# Patient Record
Sex: MALE | Race: ASIAN
[De-identification: names, ages, dates, MRNs, and addresses within clinical notes are randomized per-mention and may not be internally consistent; named-entity substitution may affect disease eponyms.]

---

## 2017-05-08 ENCOUNTER — HOSPITAL ENCOUNTER (EMERGENCY)
Dept: HOSPITAL 74 - JER | Age: 62
Discharge: HOME | End: 2017-05-08
Payer: COMMERCIAL

## 2017-05-08 VITALS — BODY MASS INDEX: 26.3 KG/M2

## 2017-05-08 VITALS — SYSTOLIC BLOOD PRESSURE: 137 MMHG | HEART RATE: 66 BPM | DIASTOLIC BLOOD PRESSURE: 81 MMHG | TEMPERATURE: 98 F

## 2017-05-08 DIAGNOSIS — E78.00: ICD-10-CM

## 2017-05-08 DIAGNOSIS — I10: Primary | ICD-10-CM

## 2017-05-08 DIAGNOSIS — Z85.038: ICD-10-CM

## 2017-05-08 DIAGNOSIS — Z79.84: ICD-10-CM

## 2017-05-08 DIAGNOSIS — E07.89: ICD-10-CM

## 2017-05-08 DIAGNOSIS — E11.9: ICD-10-CM

## 2017-05-08 DIAGNOSIS — N40.0: ICD-10-CM

## 2017-05-08 LAB
ALBUMIN SERPL-MCNC: 4.1 G/DL (ref 3.4–5)
ALP SERPL-CCNC: 48 U/L (ref 45–117)
ALT SERPL-CCNC: 23 U/L (ref 12–78)
ANION GAP SERPL CALC-SCNC: 4 MMOL/L (ref 8–16)
AST SERPL-CCNC: 18 U/L (ref 15–37)
BASOPHILS # BLD: 0.7 % (ref 0–2)
BILIRUB SERPL-MCNC: 0.9 MG/DL (ref 0.2–1)
CALCIUM SERPL-MCNC: 9.2 MG/DL (ref 8.5–10.1)
CO2 SERPL-SCNC: 30 MMOL/L (ref 21–32)
COCKROFT - GAULT: 82.15
CREAT SERPL-MCNC: 1 MG/DL (ref 0.7–1.3)
DEPRECATED RDW RBC AUTO: 13.6 % (ref 11.9–15.9)
EOSINOPHIL # BLD: 7.9 % (ref 0–4.5)
GLUCOSE SERPL-MCNC: 111 MG/DL (ref 74–106)
MCH RBC QN AUTO: 29.5 PG (ref 25.7–33.7)
MCHC RBC AUTO-ENTMCNC: 34.3 G/DL (ref 32–35.9)
MCV RBC: 86 FL (ref 80–96)
NEUTROPHILS # BLD: 59.3 % (ref 42.8–82.8)
PLATELET # BLD AUTO: 180 K/MM3 (ref 134–434)
PMV BLD: 8.6 FL (ref 7.5–11.1)
PROT SERPL-MCNC: 7 G/DL (ref 6.4–8.2)
TROPONIN I SERPL-MCNC: < 0.02 NG/ML (ref 0–0.05)
TROPONIN I SERPL-MCNC: < 0.02 NG/ML (ref 0–0.05)
WBC # BLD AUTO: 6.3 K/MM3 (ref 4–10)

## 2017-05-08 NOTE — EKG
Test Reason : 

Blood Pressure : ***/*** mmHG

Vent. Rate : 068 BPM     Atrial Rate : 068 BPM

   P-R Int : 170 ms          QRS Dur : 094 ms

    QT Int : 388 ms       P-R-T Axes : 068 007 067 degrees

   QTc Int : 412 ms

 

NORMAL SINUS RHYTHM

INCOMPLETE RIGHT BUNDLE BRANCH BLOCK

NONSPECIFIC T WAVE ABNORMALITY

ABNORMAL ECG

WHEN COMPARED WITH ECG OF 08-MAY-2017 05:53,

NO SIGNIFICANT CHANGE WAS FOUND

Confirmed by DESHAWN KAMARA MD ([id number]) on 5/8/2017 1:41:59 PM

 

Referred By:             Confirmed By:DESHAWN KAMARA MD

## 2017-05-08 NOTE — EKG
Test Reason : 

Blood Pressure : ***/*** mmHG

Vent. Rate : 064 BPM     Atrial Rate : 064 BPM

   P-R Int : 174 ms          QRS Dur : 102 ms

    QT Int : 384 ms       P-R-T Axes : 072 056 066 degrees

   QTc Int : 396 ms

 

NORMAL SINUS RHYTHM WITH SINUS ARRHYTHMIA

NORMAL ECG

WHEN COMPARED WITH ECG OF 26-JUN-2016 11:51,

NO SIGNIFICANT CHANGE WAS FOUND

Confirmed by DESHAWN KAMARA MD ([id number]) on 5/8/2017 1:29:54 PM

 

Referred By:             Confirmed By:DESHAWN KAMARA MD

## 2017-05-08 NOTE — PDOC
*Physical Exam





- Vital Signs


 Last Vital Signs











Temp Pulse Resp BP Pulse Ox


 


 97.5 F L  72   18   144/93   97 


 


 05/08/17 06:03  05/08/17 06:03  05/08/17 06:03  05/08/17 06:03  05/08/17 06:03














**Heart Score/ECG Review


  ** #2


General ECG Interpretation: Sinus Rhythm (rate68)


Compared to previous ECG there are: No significant change





ED Treatment Course





- LABORATORY


CBC & Chemistry Diagram: 


 05/08/17 06:30





 05/08/17 06:30





- ADDITIONAL ORDERS


Additional order review: 


 Laboratory  Results











  05/08/17





  06:30


 


Sodium  139


 


Potassium  4.6


 


Chloride  105


 


Carbon Dioxide  30


 


Anion Gap  4 L


 


BUN  14


 


Creatinine  1.0


 


Creat Clearance w eGFR  > 60


 


Random Glucose  111 H


 


Calcium  9.2


 


Total Bilirubin  0.9


 


AST  18


 


ALT  23


 


Alkaline Phosphatase  48


 


Creatine Kinase  111


 


Troponin I  < 0.02


 


Total Protein  7.0


 


Albumin  4.1  D








 











  05/08/17





  06:30


 


RBC  5.25


 


MCV  86.0


 


MCHC  34.3


 


RDW  13.6


 


MPV  8.6


 


Neutrophils %  59.3  D


 


Lymphocytes %  26.6  D


 


Monocytes %  5.5


 


Eosinophils %  7.9 H


 


Basophils %  0.7














Medical Decision Making





- Medical Decision Making


05/08/17 07:30


Patient received in sign out from SHAYLA Bauman. Patient woke up at 1 AM with 

chest pressure took Diovan and symptoms relieved but decided to come to the ER. 

Patient has been asymptomatic here and is pending a second troponin Along with 

an EKG at 10:30 AM>





05/08/17 07:30


 Laboratory Tests











  05/08/17 05/08/17





  06:30 06:30


 


WBC  6.3 


 


Hgb  15.5 


 


Hct  45.1 


 


Plt Count  180 


 


Neutrophils %  59.3  D 


 


Sodium   139


 


Potassium   4.6


 


Chloride   105


 


Carbon Dioxide   30


 


Anion Gap   4 L


 


BUN   14


 


Creatinine   1.0


 


Random Glucose   111 H


 


Calcium   9.2


 


AST   18


 


ALT   23


 


Alkaline Phosphatase   48


 


Troponin I   < 0.02














05/08/17 12:26


 Selected Entries











  05/08/17





  07:15


 


Temperature 97.7 F


 


Pulse Rate [ 68





Right Radial] 


 


Respiratory 18





Rate 


 


Blood Pressure 132/92





[Left Arm] 


 


O2 Sat by Pulse 100





Oximetry (%) 








 Laboratory Tests











  05/08/17





  11:29


 


Creatine Kinase  95


 


Troponin I  < 0.02





Patient remains asymptomatic and will discharge home








*DC/Admit/Observation/Transfer


Diagnosis at time of Disposition: 


Hypertension


Qualifiers:


 Hypertension type: essential hypertension Qualified Code(s): I10 - Essential (

primary) hypertension





Chest pain


Qualifiers:


 Chest pain type: unspecified Qualified Code(s): R07.9 - Chest pain, unspecified





- Discharge Dispostion


Condition at time of disposition: Improved





- Referrals


Referrals: 


Becki Dugan MD [Primary Care Provider] - 





- Patient Instructions


Printed Discharge Instructions:  DI for High Blood Pressure


Additional Instructions: 


Please continue taking medication as prescribed and follow-up with your PCP.


If his symptoms return or worsen, you may come to the ER.

## 2017-05-08 NOTE — PDOC
History of Present Illness





- General


Chief Complaint: Blood Pressure Problem


Stated Complaint: HIGH BP,  CHEST DISCOMFORT


Time Seen by Provider: 05/08/17 07:00


History Source: Patient


Exam Limitations: No Limitations





- History of Present Illness


Initial Comments: 


05/08/17 07:02


Patient is a 61-year-old male with history of hypertension, diabetes, BPH, HLD, 

colon cancer, bowel resection, no chemo or radiation, complaining of chest 

pressure and elevated blood pressure. Patient states that he woke up this 

morning with palpitationsheart racing. He got up and took his blood pre and it 

was elevated on the right to 170/110 and 150/90 on the left.  Shortly after had 

sudden onset of hot flashes and pain in the neck, chest pain described as a 

pressure, 9/10, nonpleuritic, and dizziness described as spinning.  denies 

dizziness, nausea, vomiting.  He took 80 mg of Diovan and then reported to the 

emergency room for evaluation. Patient states that he's been having problems 

with elevation in blood pressure for x 1 week.  Stress/ECHO normal 4 years ago.

  No recent travel no leg swelling.  








PMD:  Dr. Dugan


Cardio: Dr. Anne


PMHX: as above


PSocHX:  neg etoh, drug, cig


FAMhx: noncontributory


ALL:  NKDA





GENERAL/CONSTITUTIONAL: [No fever or chills. No weakness. No weight change.]


HEAD, EYES, EARS, NOSE AND THROAT: [No change in vision. No ear pain or 

discharge. No sore throat.]


CARDIOVASCULAR: (+) chest pain or shortness of breath.]


RESPIRATORY: [No cough, wheezing, or hemoptysis.]


GASTROINTESTINAL: [No nausea, vomiting, diarrhea or constipation. No rectal 

bleeding.]


GENITOURINARY: [No dysuria, frequency, or change in urination.]


MUSCULOSKELETAL: [No joint or muscle swelling or pain. No neck or back pain.]


SKIN AND BREASTS: [No rash or easy bruising.]


NEUROLOGIC: [No headache, vertigo, loss of consciousness, or loss of sensation.]


PSYCHIATRIC: [No depression or anxiety.]


ENDOCRINE: [No increased thirst. No abnormal weight change.]


HEMATOLOGIC/LYMPHATIC: [No anemia, easy bleeding, or history of blood clots.]


ALLERGIC/IMMUNOLOGIC: [No hives or skin allergy. No latex allergy.]





GENERAL: [The patient is awake, alert, and fully oriented, in no acute distress.

]


HEAD: [Normal with no signs of trauma.]


EYES: [Pupils equal, round and reactive to light, extraocular movements intact, 

sclera anicteric, conjunctiva clear.]


ENT: [Ears normal, nares patent, oropharynx clear without exudates.  Moist 

mucous membranes.]


NECK: [Normal range of motion, supple without lymphadenopathy, JVD, or masses.]


LUNGS: [Breath sounds equal, clear to auscultation bilaterally.  No wheezes, 

and no crackles.]


HEART: [Regular rate and rhythm, normal S1 and S2 without murmur, rub.]


ABDOMEN: [Soft, nontender, normoactive bowel sounds.  No guarding, no rebound.  

No masses.]


EXTREMITIES: [Normal range of motion, no edema.  No clubbing or cyanosis. No 

cords, erythema, or tenderness.]


NEUROLOGICAL: [Cranial nerves II through XII grossly intact.  Normal speech, 

normal gait.]


PSYCH: [Normal mood, normal affect.]


SKIN: [Warm, Dry, normal turgor, no rashes or lesions noted.]





Past History





- Past Medical History


Allergies/Adverse Reactions: 


 Allergies











Allergy/AdvReac Type Severity Reaction Status Date / Time


 


No Known Allergies Allergy   Verified 05/08/17 06:08











Home Medications: 


Ambulatory Orders





Aspirin [ASA -] 81 mg PO DAILY 06/26/16 


Metformin HCl [Metformin HCl ER] 1,000 mg PO DAILY 06/26/16 


Pravastatin Sodium [Pravachol -] 80 mg PO HS 06/26/16 


Valsartan [Diovan] 160 mg PO DAILY #30 tablet 06/27/16 


Diltiazem Cd [Cardizem Cd -] 240 mg PO DAILY 05/08/17 


Dutasteride [Avodart] 0.5 mg PO HS 05/08/17 








Cancer: Yes (colon)


HTN: Yes


Hypercholesterolemia: Yes





- Surgical History


GI Surgery: Yes (colon cancer)





- Psycho/Social/Smoking Cessation Hx


Suicidal Ideation: No


Smoking History: Former smoker


Have you smoked in the past 12 months: No


If you are a former smoker, when did you quit?: 1987


Information on smoking cessation initiated: No


Hx Alcohol Use: No


Drug/Substance Use Hx: No


Substance Use Type: None


Hx Substance Use Treatment: No





*Physical Exam





- Vital Signs


 Last Vital Signs











Temp Pulse Resp BP Pulse Ox


 


 97.5 F L  72   18   144/93   97 


 


 05/08/17 06:03  05/08/17 06:03  05/08/17 06:03  05/08/17 06:03  05/08/17 06:03














ED Treatment Course





- LABORATORY


CBC & Chemistry Diagram: 


 05/08/17 06:30





 05/08/17 06:30





- ADDITIONAL ORDERS


Additional order review: 


 Laboratory  Results











  05/08/17





  06:30


 


Sodium  139


 


Potassium  4.6


 


Chloride  105


 


Carbon Dioxide  30


 


Anion Gap  4 L


 


BUN  14


 


Creatinine  1.0


 


Creat Clearance w eGFR  > 60


 


Random Glucose  111 H


 


Calcium  9.2


 


Total Bilirubin  0.9


 


AST  18


 


ALT  23


 


Alkaline Phosphatase  48


 


Creatine Kinase  111


 


Troponin I  < 0.02


 


Total Protein  7.0


 


Albumin  4.1  D








 











  05/08/17





  06:30


 


RBC  5.25


 


MCV  86.0


 


MCHC  34.3


 


RDW  13.6


 


MPV  8.6


 


Neutrophils %  59.3  D


 


Lymphocytes %  26.6  D


 


Monocytes %  5.5


 


Eosinophils %  7.9 H


 


Basophils %  0.7














- RADIOLOGY


Radiology Studies Ordered: 














 Category Date Time Status


 


 CHEST X-RAY PORTABLE* [RAD] Stat Radiology  05/08/17 06:26 Taken














Medical Decision Making





- Medical Decision Making





05/08/17 07:36


Patient is a 61-year-old male with history of hypertension, diabetes, BPH, HLD, 

colon cancer, bowel resection, no chemo or radiation, complaining of chest 

pressure and elevated blood pressure.  


patient is elevated cardiac risk factors will get troponin/EKG, labs, aspirin 

325 mg








EKG Normal sinus [id number], normal axis,no ST-T wave





case was endorsed still NP Deanna pending labs and disposition.





*DC/Admit/Observation/Transfer


Diagnosis at time of Disposition: 


Hypertension


Qualifiers:


 Hypertension type: essential hypertension Qualified Code(s): I10 - Essential (

primary) hypertension





Chest pain


Qualifiers:


 Chest pain type: unspecified Qualified Code(s): R07.9 - Chest pain, unspecified

## 2022-12-19 ENCOUNTER — HOSPITAL ENCOUNTER (INPATIENT)
Dept: HOSPITAL 74 - JER | Age: 67
LOS: 4 days | Discharge: HOME | DRG: 194 | End: 2022-12-23
Attending: INTERNAL MEDICINE | Admitting: HOSPITALIST
Payer: COMMERCIAL

## 2022-12-19 VITALS — BODY MASS INDEX: 25.8 KG/M2

## 2022-12-19 DIAGNOSIS — R33.8: ICD-10-CM

## 2022-12-19 DIAGNOSIS — B96.89: ICD-10-CM

## 2022-12-19 DIAGNOSIS — N17.9: ICD-10-CM

## 2022-12-19 DIAGNOSIS — I10: ICD-10-CM

## 2022-12-19 DIAGNOSIS — R19.7: ICD-10-CM

## 2022-12-19 DIAGNOSIS — E11.9: ICD-10-CM

## 2022-12-19 DIAGNOSIS — J10.1: Primary | ICD-10-CM

## 2022-12-19 DIAGNOSIS — N32.81: ICD-10-CM

## 2022-12-19 DIAGNOSIS — N40.1: ICD-10-CM

## 2022-12-19 DIAGNOSIS — E78.5: ICD-10-CM

## 2022-12-19 DIAGNOSIS — Z85.038: ICD-10-CM

## 2022-12-19 DIAGNOSIS — K57.90: ICD-10-CM

## 2022-12-19 DIAGNOSIS — N39.0: ICD-10-CM

## 2022-12-19 DIAGNOSIS — N20.0: ICD-10-CM

## 2022-12-19 LAB
ALBUMIN SERPL-MCNC: 2.8 G/DL (ref 3.4–5)
ALP SERPL-CCNC: 102 U/L (ref 45–117)
ALT SERPL-CCNC: 25 U/L (ref 13–61)
ANION GAP SERPL CALC-SCNC: 8 MMOL/L (ref 8–16)
APPEARANCE UR: (no result)
AST SERPL-CCNC: 28 U/L (ref 15–37)
BACTERIA # UR AUTO: (no result) /UL (ref 0–1359)
BASOPHILS # BLD: 0.2 % (ref 0–2)
BILIRUB SERPL-MCNC: 1.1 MG/DL (ref 0.2–1)
BILIRUB UR STRIP.AUTO-MCNC: NEGATIVE MG/DL
BUN SERPL-MCNC: 32.4 MG/DL (ref 7–18)
CALCIUM SERPL-MCNC: 10.1 MG/DL (ref 8.5–10.1)
CASTS URNS QL MICRO: 2 /UL (ref 0–3.1)
CHLORIDE SERPL-SCNC: 105 MMOL/L (ref 98–107)
CO2 SERPL-SCNC: 27 MMOL/L (ref 21–32)
COLOR UR: (no result)
CREAT SERPL-MCNC: 2.4 MG/DL (ref 0.55–1.3)
DEPRECATED RDW RBC AUTO: 14.7 % (ref 11.9–15.9)
EOSINOPHIL # BLD: 0.9 % (ref 0–4.5)
EPITH CASTS URNS QL MICRO: 3 /UL (ref 0–25.1)
GLUCOSE SERPL-MCNC: 156 MG/DL (ref 74–106)
HCT VFR BLD CALC: 45.7 % (ref 35.4–49)
HGB BLD-MCNC: 15.1 GM/DL (ref 11.7–16.9)
KETONES UR QL STRIP: NEGATIVE
LEUKOCYTE ESTERASE UR QL STRIP.AUTO: (no result)
LIPASE SERPL-CCNC: 267 U/L (ref 73–393)
LYMPHOCYTES # BLD: 9.6 % (ref 8–40)
MCH RBC QN AUTO: 28.5 PG (ref 25.7–33.7)
MCHC RBC AUTO-ENTMCNC: 33.1 G/DL (ref 32–35.9)
MCV RBC: 86.1 FL (ref 80–96)
MONOCYTES # BLD AUTO: 6.1 % (ref 3.8–10.2)
NEUTROPHILS # BLD: 83.2 % (ref 42.8–82.8)
NITRITE UR QL STRIP: POSITIVE
PH UR: 5.5 [PH] (ref 5–8)
PLATELET # BLD AUTO: 77 10^3/UL (ref 134–434)
PMV BLD: 9.6 FL (ref 7.5–11.1)
PROT SERPL-MCNC: 6.3 G/DL (ref 6.4–8.2)
PROT UR QL STRIP: (no result)
PROT UR QL STRIP: NEGATIVE
RBC # BLD AUTO: 102 /UL (ref 0–23.9)
RBC # BLD AUTO: 5.31 M/MM3 (ref 4–5.6)
SODIUM SERPL-SCNC: 140 MMOL/L (ref 136–145)
SP GR UR: 1.02 (ref 1.01–1.03)
UROBILINOGEN UR STRIP-MCNC: 0.2 MG/DL (ref 0.2–1)
WBC # BLD AUTO: 8.2 K/MM3 (ref 4–10)
WBC # UR AUTO: 777 /UL (ref 0–25.8)

## 2022-12-20 LAB
ALBUMIN SERPL-MCNC: 2.4 G/DL (ref 3.4–5)
ALP SERPL-CCNC: 83 U/L (ref 45–117)
ALT SERPL-CCNC: 20 U/L (ref 13–61)
ANION GAP SERPL CALC-SCNC: 8 MMOL/L (ref 8–16)
APPEARANCE UR: CLEAR
AST SERPL-CCNC: 20 U/L (ref 15–37)
BACTERIA # UR AUTO: 9 /UL (ref 0–1359)
BILIRUB SERPL-MCNC: 1.1 MG/DL (ref 0.2–1)
BILIRUB UR STRIP.AUTO-MCNC: NEGATIVE MG/DL
BUN SERPL-MCNC: 31.5 MG/DL (ref 7–18)
CALCIUM SERPL-MCNC: 9 MG/DL (ref 8.5–10.1)
CASTS URNS QL MICRO: 1 /UL (ref 0–3.1)
CHLORIDE SERPL-SCNC: 110 MMOL/L (ref 98–107)
CO2 SERPL-SCNC: 24 MMOL/L (ref 21–32)
COLOR UR: YELLOW
CREAT SERPL-MCNC: 2.1 MG/DL (ref 0.55–1.3)
DEPRECATED RDW RBC AUTO: 15.1 % (ref 11.9–15.9)
EPITH CASTS URNS QL MICRO: 12 /UL (ref 0–25.1)
GLUCOSE SERPL-MCNC: 92 MG/DL (ref 74–106)
HCT VFR BLD CALC: 42.4 % (ref 35.4–49)
HGB BLD-MCNC: 14.2 GM/DL (ref 11.7–16.9)
KETONES UR QL STRIP: NEGATIVE
LEUKOCYTE ESTERASE UR QL STRIP.AUTO: (no result)
MAGNESIUM SERPL-MCNC: 1.7 MG/DL (ref 1.8–2.4)
MCH RBC QN AUTO: 28.6 PG (ref 25.7–33.7)
MCHC RBC AUTO-ENTMCNC: 33.4 G/DL (ref 32–35.9)
MCV RBC: 85.5 FL (ref 80–96)
NITRITE UR QL STRIP: NEGATIVE
PH UR: 5 [PH] (ref 5–8)
PHOSPHATE SERPL-MCNC: 2.5 MG/DL (ref 2.5–4.9)
PLATELET # BLD AUTO: 85 10^3/UL (ref 134–434)
PMV BLD: 9.7 FL (ref 7.5–11.1)
PROT SERPL-MCNC: 5.6 G/DL (ref 6.4–8.2)
PROT UR QL STRIP: (no result)
PROT UR QL STRIP: NEGATIVE
RBC # BLD AUTO: 4.97 M/MM3 (ref 4–5.6)
RBC # BLD AUTO: 69.4 /UL (ref 0–23.9)
SODIUM SERPL-SCNC: 142 MMOL/L (ref 136–145)
SP GR UR: 1.01 (ref 1.01–1.03)
UROBILINOGEN UR STRIP-MCNC: 0.2 MG/DL (ref 0.2–1)
WBC # BLD AUTO: 7.7 K/MM3 (ref 4–10)
WBC # UR AUTO: 381 /UL (ref 0–25.8)

## 2022-12-20 RX ADMIN — HEPARIN SODIUM SCH UNIT: 5000 INJECTION, SOLUTION INTRAVENOUS; SUBCUTANEOUS at 13:45

## 2022-12-20 RX ADMIN — EZETIMIBE SCH MG: 10 TABLET ORAL at 16:09

## 2022-12-20 RX ADMIN — HEPARIN SODIUM SCH: 5000 INJECTION, SOLUTION INTRAVENOUS; SUBCUTANEOUS at 08:23

## 2022-12-20 RX ADMIN — HEPARIN SODIUM SCH UNIT: 5000 INJECTION, SOLUTION INTRAVENOUS; SUBCUTANEOUS at 01:24

## 2022-12-20 RX ADMIN — INSULIN ASPART SCH: 100 INJECTION, SOLUTION INTRAVENOUS; SUBCUTANEOUS at 08:24

## 2022-12-20 RX ADMIN — OSELTAMIVIR PHOSPHATE SCH MG: 30 CAPSULE ORAL at 09:54

## 2022-12-20 RX ADMIN — INSULIN ASPART SCH: 100 INJECTION, SOLUTION INTRAVENOUS; SUBCUTANEOUS at 23:58

## 2022-12-20 RX ADMIN — INSULIN ASPART SCH UNIT: 100 INJECTION, SOLUTION INTRAVENOUS; SUBCUTANEOUS at 22:12

## 2022-12-20 RX ADMIN — SENNOSIDES SCH ML: 8.8 SYRUP ORAL at 21:50

## 2022-12-20 RX ADMIN — PIPERACILLIN SODIUM AND TAZOBACTAM SODIUM SCH MLS/HR: .25; 2 INJECTION, POWDER, LYOPHILIZED, FOR SOLUTION INTRAVENOUS at 14:24

## 2022-12-20 RX ADMIN — ACETAMINOPHEN PRN MG: 325 TABLET ORAL at 21:58

## 2022-12-20 RX ADMIN — PIPERACILLIN SODIUM AND TAZOBACTAM SODIUM SCH MLS/HR: .25; 2 INJECTION, POWDER, LYOPHILIZED, FOR SOLUTION INTRAVENOUS at 18:57

## 2022-12-20 RX ADMIN — SODIUM CHLORIDE, POTASSIUM CHLORIDE, SODIUM LACTATE AND CALCIUM CHLORIDE SCH MLS/HR: 600; 310; 30; 20 INJECTION, SOLUTION INTRAVENOUS at 18:56

## 2022-12-20 RX ADMIN — HEPARIN SODIUM SCH UNIT: 5000 INJECTION, SOLUTION INTRAVENOUS; SUBCUTANEOUS at 21:50

## 2022-12-20 RX ADMIN — OSELTAMIVIR PHOSPHATE SCH MG: 30 CAPSULE ORAL at 01:24

## 2022-12-20 RX ADMIN — SODIUM CHLORIDE, POTASSIUM CHLORIDE, SODIUM LACTATE AND CALCIUM CHLORIDE SCH MLS/HR: 600; 310; 30; 20 INJECTION, SOLUTION INTRAVENOUS at 13:38

## 2022-12-20 RX ADMIN — CARVEDILOL SCH MG: 12.5 TABLET, FILM COATED ORAL at 21:49

## 2022-12-20 RX ADMIN — INSULIN ASPART SCH: 100 INJECTION, SOLUTION INTRAVENOUS; SUBCUTANEOUS at 01:24

## 2022-12-20 RX ADMIN — INSULIN ASPART SCH: 100 INJECTION, SOLUTION INTRAVENOUS; SUBCUTANEOUS at 12:32

## 2022-12-20 RX ADMIN — ATORVASTATIN CALCIUM SCH MG: 80 TABLET, FILM COATED ORAL at 21:49

## 2022-12-21 LAB
ALBUMIN SERPL-MCNC: 2.4 G/DL (ref 3.4–5)
ALP SERPL-CCNC: 75 U/L (ref 45–117)
ALT SERPL-CCNC: 18 U/L (ref 13–61)
ANION GAP SERPL CALC-SCNC: 9 MMOL/L (ref 8–16)
AST SERPL-CCNC: 24 U/L (ref 15–37)
BILIRUB SERPL-MCNC: 0.7 MG/DL (ref 0.2–1)
BUN SERPL-MCNC: 18.5 MG/DL (ref 7–18)
CALCIUM SERPL-MCNC: 8.8 MG/DL (ref 8.5–10.1)
CHLORIDE SERPL-SCNC: 102 MMOL/L (ref 98–107)
CO2 SERPL-SCNC: 25 MMOL/L (ref 21–32)
CREAT SERPL-MCNC: 1.6 MG/DL (ref 0.55–1.3)
GLUCOSE SERPL-MCNC: 178 MG/DL (ref 74–106)
PROT SERPL-MCNC: 5.4 G/DL (ref 6.4–8.2)
SODIUM SERPL-SCNC: 136 MMOL/L (ref 136–145)

## 2022-12-21 RX ADMIN — TAMSULOSIN HYDROCHLORIDE SCH: 0.4 CAPSULE ORAL at 14:09

## 2022-12-21 RX ADMIN — SODIUM CHLORIDE, POTASSIUM CHLORIDE, SODIUM LACTATE AND CALCIUM CHLORIDE SCH MLS/HR: 600; 310; 30; 20 INJECTION, SOLUTION INTRAVENOUS at 21:47

## 2022-12-21 RX ADMIN — INSULIN ASPART SCH UNIT: 100 INJECTION, SOLUTION INTRAVENOUS; SUBCUTANEOUS at 22:04

## 2022-12-21 RX ADMIN — KETOROLAC TROMETHAMINE SCH MG: 10 TABLET, FILM COATED ORAL at 22:55

## 2022-12-21 RX ADMIN — PIPERACILLIN SODIUM AND TAZOBACTAM SODIUM SCH MLS/HR: .25; 2 INJECTION, POWDER, LYOPHILIZED, FOR SOLUTION INTRAVENOUS at 01:11

## 2022-12-21 RX ADMIN — HEPARIN SODIUM SCH UNIT: 5000 INJECTION, SOLUTION INTRAVENOUS; SUBCUTANEOUS at 13:57

## 2022-12-21 RX ADMIN — HEPARIN SODIUM SCH UNIT: 5000 INJECTION, SOLUTION INTRAVENOUS; SUBCUTANEOUS at 06:23

## 2022-12-21 RX ADMIN — PHENAZOPYRIDINE SCH MG: 100 TABLET ORAL at 11:22

## 2022-12-21 RX ADMIN — INSULIN ASPART SCH: 100 INJECTION, SOLUTION INTRAVENOUS; SUBCUTANEOUS at 16:33

## 2022-12-21 RX ADMIN — TAMSULOSIN HYDROCHLORIDE SCH MG: 0.4 CAPSULE ORAL at 13:57

## 2022-12-21 RX ADMIN — CARVEDILOL SCH: 12.5 TABLET, FILM COATED ORAL at 23:31

## 2022-12-21 RX ADMIN — SENNOSIDES SCH ML: 8.8 SYRUP ORAL at 21:48

## 2022-12-21 RX ADMIN — INSULIN ASPART SCH UNIT: 100 INJECTION, SOLUTION INTRAVENOUS; SUBCUTANEOUS at 11:33

## 2022-12-21 RX ADMIN — EZETIMIBE SCH MG: 10 TABLET ORAL at 10:39

## 2022-12-21 RX ADMIN — ATORVASTATIN CALCIUM SCH MG: 80 TABLET, FILM COATED ORAL at 21:48

## 2022-12-21 RX ADMIN — SODIUM CHLORIDE, POTASSIUM CHLORIDE, SODIUM LACTATE AND CALCIUM CHLORIDE SCH: 600; 310; 30; 20 INJECTION, SOLUTION INTRAVENOUS at 14:06

## 2022-12-21 RX ADMIN — CARVEDILOL SCH MG: 12.5 TABLET, FILM COATED ORAL at 10:39

## 2022-12-21 RX ADMIN — HEPARIN SODIUM SCH: 5000 INJECTION, SOLUTION INTRAVENOUS; SUBCUTANEOUS at 14:09

## 2022-12-21 RX ADMIN — PIPERACILLIN SODIUM AND TAZOBACTAM SODIUM SCH MLS/HR: .25; 2 INJECTION, POWDER, LYOPHILIZED, FOR SOLUTION INTRAVENOUS at 10:39

## 2022-12-21 RX ADMIN — CEFTRIAXONE SCH MLS/HR: 1 INJECTION, POWDER, FOR SOLUTION INTRAMUSCULAR; INTRAVENOUS at 16:24

## 2022-12-21 RX ADMIN — HEPARIN SODIUM SCH UNIT: 5000 INJECTION, SOLUTION INTRAVENOUS; SUBCUTANEOUS at 21:48

## 2022-12-21 RX ADMIN — SENNOSIDES SCH: 8.8 SYRUP ORAL at 22:04

## 2022-12-21 RX ADMIN — INSULIN ASPART SCH: 100 INJECTION, SOLUTION INTRAVENOUS; SUBCUTANEOUS at 06:44

## 2022-12-22 LAB
ALBUMIN SERPL-MCNC: 2.5 G/DL (ref 3.4–5)
ALP SERPL-CCNC: 72 U/L (ref 45–117)
ALT SERPL-CCNC: 38 U/L (ref 13–61)
ANION GAP SERPL CALC-SCNC: 7 MMOL/L (ref 8–16)
AST SERPL-CCNC: 51 U/L (ref 15–37)
BILIRUB SERPL-MCNC: 0.8 MG/DL (ref 0.2–1)
BUN SERPL-MCNC: 16 MG/DL (ref 7–18)
CALCIUM SERPL-MCNC: 9.1 MG/DL (ref 8.5–10.1)
CHLORIDE SERPL-SCNC: 100 MMOL/L (ref 98–107)
CO2 SERPL-SCNC: 28 MMOL/L (ref 21–32)
CREAT SERPL-MCNC: 1.6 MG/DL (ref 0.55–1.3)
DEPRECATED RDW RBC AUTO: 15.6 % (ref 11.9–15.9)
GLUCOSE SERPL-MCNC: 170 MG/DL (ref 74–106)
HCT VFR BLD CALC: 41.5 % (ref 35.4–49)
HGB BLD-MCNC: 13.4 GM/DL (ref 11.7–16.9)
INR BLD: 1.03 (ref 0.83–1.09)
MCH RBC QN AUTO: 27.7 PG (ref 25.7–33.7)
MCHC RBC AUTO-ENTMCNC: 32.4 G/DL (ref 32–35.9)
MCV RBC: 85.5 FL (ref 80–96)
PLATELET # BLD AUTO: 128 10^3/UL (ref 134–434)
PMV BLD: 8.6 FL (ref 7.5–11.1)
PROT SERPL-MCNC: 5.9 G/DL (ref 6.4–8.2)
PT PNL PPP: 11.8 SEC (ref 9.7–13)
RBC # BLD AUTO: 4.85 M/MM3 (ref 4–5.6)
SODIUM SERPL-SCNC: 136 MMOL/L (ref 136–145)
WBC # BLD AUTO: 6.2 K/MM3 (ref 4–10)

## 2022-12-22 RX ADMIN — EZETIMIBE SCH MG: 10 TABLET ORAL at 11:12

## 2022-12-22 RX ADMIN — INSULIN ASPART SCH: 100 INJECTION, SOLUTION INTRAVENOUS; SUBCUTANEOUS at 06:39

## 2022-12-22 RX ADMIN — CARVEDILOL SCH MG: 12.5 TABLET, FILM COATED ORAL at 11:12

## 2022-12-22 RX ADMIN — FINASTERIDE SCH MG: 5 TABLET, FILM COATED ORAL at 17:59

## 2022-12-22 RX ADMIN — KETOROLAC TROMETHAMINE SCH MG: 10 TABLET, FILM COATED ORAL at 11:11

## 2022-12-22 RX ADMIN — FINASTERIDE SCH: 5 TABLET, FILM COATED ORAL at 18:28

## 2022-12-22 RX ADMIN — INSULIN ASPART SCH: 100 INJECTION, SOLUTION INTRAVENOUS; SUBCUTANEOUS at 13:12

## 2022-12-22 RX ADMIN — INSULIN ASPART SCH: 100 INJECTION, SOLUTION INTRAVENOUS; SUBCUTANEOUS at 22:20

## 2022-12-22 RX ADMIN — INSULIN ASPART SCH: 100 INJECTION, SOLUTION INTRAVENOUS; SUBCUTANEOUS at 17:18

## 2022-12-22 RX ADMIN — SENNOSIDES SCH ML: 8.8 SYRUP ORAL at 22:12

## 2022-12-22 RX ADMIN — PHENAZOPYRIDINE SCH MG: 100 TABLET ORAL at 11:12

## 2022-12-22 RX ADMIN — CARVEDILOL SCH MG: 12.5 TABLET, FILM COATED ORAL at 22:10

## 2022-12-22 RX ADMIN — CEFTRIAXONE SCH MLS/HR: 1 INJECTION, POWDER, FOR SOLUTION INTRAMUSCULAR; INTRAVENOUS at 11:09

## 2022-12-22 RX ADMIN — HEPARIN SODIUM SCH UNIT: 5000 INJECTION, SOLUTION INTRAVENOUS; SUBCUTANEOUS at 13:08

## 2022-12-22 RX ADMIN — SODIUM CHLORIDE SCH MLS/HR: 4.5 INJECTION, SOLUTION INTRAVENOUS at 17:04

## 2022-12-22 RX ADMIN — TAMSULOSIN HYDROCHLORIDE SCH MG: 0.4 CAPSULE ORAL at 11:12

## 2022-12-22 RX ADMIN — HEPARIN SODIUM SCH UNIT: 5000 INJECTION, SOLUTION INTRAVENOUS; SUBCUTANEOUS at 05:58

## 2022-12-22 RX ADMIN — TAMSULOSIN HYDROCHLORIDE SCH: 0.4 CAPSULE ORAL at 12:13

## 2022-12-22 RX ADMIN — KETOROLAC TROMETHAMINE SCH MG: 10 TABLET, FILM COATED ORAL at 17:05

## 2022-12-22 RX ADMIN — KETOROLAC TROMETHAMINE SCH MG: 10 TABLET, FILM COATED ORAL at 04:45

## 2022-12-22 RX ADMIN — HEPARIN SODIUM SCH UNIT: 5000 INJECTION, SOLUTION INTRAVENOUS; SUBCUTANEOUS at 22:11

## 2022-12-23 VITALS — SYSTOLIC BLOOD PRESSURE: 152 MMHG | HEART RATE: 67 BPM | DIASTOLIC BLOOD PRESSURE: 71 MMHG | TEMPERATURE: 98.6 F

## 2022-12-23 VITALS — RESPIRATION RATE: 18 BRPM

## 2022-12-23 LAB
ALBUMIN SERPL-MCNC: 2.5 G/DL (ref 3.4–5)
ALP SERPL-CCNC: 80 U/L (ref 45–117)
ALT SERPL-CCNC: 65 U/L (ref 13–61)
ANION GAP SERPL CALC-SCNC: 9 MMOL/L (ref 8–16)
AST SERPL-CCNC: 69 U/L (ref 15–37)
BILIRUB SERPL-MCNC: 0.8 MG/DL (ref 0.2–1)
BUN SERPL-MCNC: 20.7 MG/DL (ref 7–18)
CALCIUM SERPL-MCNC: 8.9 MG/DL (ref 8.5–10.1)
CHLORIDE SERPL-SCNC: 102 MMOL/L (ref 98–107)
CO2 SERPL-SCNC: 26 MMOL/L (ref 21–32)
CREAT SERPL-MCNC: 1.5 MG/DL (ref 0.55–1.3)
DEPRECATED RDW RBC AUTO: 15 % (ref 11.9–15.9)
GLUCOSE SERPL-MCNC: 146 MG/DL (ref 74–106)
HCT VFR BLD CALC: 41.8 % (ref 35.4–49)
HGB BLD-MCNC: 13.7 GM/DL (ref 11.7–16.9)
MCH RBC QN AUTO: 27.7 PG (ref 25.7–33.7)
MCHC RBC AUTO-ENTMCNC: 32.7 G/DL (ref 32–35.9)
MCV RBC: 84.9 FL (ref 80–96)
PLATELET # BLD AUTO: 167 10^3/UL (ref 134–434)
PMV BLD: 8.7 FL (ref 7.5–11.1)
PROT SERPL-MCNC: 6 G/DL (ref 6.4–8.2)
RBC # BLD AUTO: 4.92 M/MM3 (ref 4–5.6)
SODIUM SERPL-SCNC: 138 MMOL/L (ref 136–145)
WBC # BLD AUTO: 7.6 K/MM3 (ref 4–10)

## 2022-12-23 RX ADMIN — ACETAMINOPHEN PRN MG: 325 TABLET ORAL at 03:55

## 2022-12-23 RX ADMIN — HEPARIN SODIUM SCH UNIT: 5000 INJECTION, SOLUTION INTRAVENOUS; SUBCUTANEOUS at 06:37

## 2022-12-23 RX ADMIN — PHENAZOPYRIDINE SCH MG: 100 TABLET ORAL at 09:03

## 2022-12-23 RX ADMIN — SODIUM CHLORIDE SCH MLS/HR: 4.5 INJECTION, SOLUTION INTRAVENOUS at 06:04

## 2022-12-23 RX ADMIN — CARVEDILOL SCH MG: 12.5 TABLET, FILM COATED ORAL at 09:03

## 2022-12-23 RX ADMIN — INSULIN ASPART SCH: 100 INJECTION, SOLUTION INTRAVENOUS; SUBCUTANEOUS at 06:38

## 2022-12-23 RX ADMIN — FINASTERIDE SCH MG: 5 TABLET, FILM COATED ORAL at 09:03

## 2022-12-23 RX ADMIN — HEPARIN SODIUM SCH UNIT: 5000 INJECTION, SOLUTION INTRAVENOUS; SUBCUTANEOUS at 14:52

## 2022-12-23 RX ADMIN — CEFTRIAXONE SCH MLS/HR: 1 INJECTION, POWDER, FOR SOLUTION INTRAMUSCULAR; INTRAVENOUS at 09:03

## 2022-12-23 RX ADMIN — EZETIMIBE SCH MG: 10 TABLET ORAL at 09:03

## 2022-12-23 RX ADMIN — INSULIN ASPART SCH: 100 INJECTION, SOLUTION INTRAVENOUS; SUBCUTANEOUS at 16:50

## 2022-12-23 RX ADMIN — INSULIN ASPART SCH: 100 INJECTION, SOLUTION INTRAVENOUS; SUBCUTANEOUS at 11:31
